# Patient Record
Sex: FEMALE | Race: WHITE | NOT HISPANIC OR LATINO | URBAN - METROPOLITAN AREA
[De-identification: names, ages, dates, MRNs, and addresses within clinical notes are randomized per-mention and may not be internally consistent; named-entity substitution may affect disease eponyms.]

---

## 2017-06-09 ENCOUNTER — EMERGENCY (EMERGENCY)
Facility: HOSPITAL | Age: 27
LOS: 1 days | Discharge: HOME | End: 2017-06-09
Attending: EMERGENCY MEDICINE | Admitting: EMERGENCY MEDICINE
Payer: MEDICAID

## 2017-06-09 VITALS
HEART RATE: 90 BPM | SYSTOLIC BLOOD PRESSURE: 147 MMHG | DIASTOLIC BLOOD PRESSURE: 90 MMHG | TEMPERATURE: 98 F | OXYGEN SATURATION: 100 % | RESPIRATION RATE: 18 BRPM

## 2017-06-09 PROCEDURE — 99284 EMERGENCY DEPT VISIT MOD MDM: CPT

## 2017-06-09 PROCEDURE — 73140 X-RAY EXAM OF FINGER(S): CPT | Mod: 26,LT

## 2017-06-09 RX ORDER — ACETAMINOPHEN 500 MG
975 TABLET ORAL ONCE
Qty: 0 | Refills: 0 | Status: COMPLETED | OUTPATIENT
Start: 2017-06-09 | End: 2017-06-09

## 2017-06-09 RX ADMIN — Medication 975 MILLIGRAM(S): at 14:33

## 2017-06-09 RX ADMIN — Medication 1 TABLET(S): at 19:02

## 2017-06-09 RX ADMIN — Medication 975 MILLIGRAM(S): at 19:02

## 2017-06-09 NOTE — ED PROVIDER NOTE - PROGRESS NOTE DETAILS
Hand surgery bedside. will send patient to hand clinic and follow up. will call [phone] (plastic surgery clinic) to follow up with Dr. Brush.

## 2017-06-09 NOTE — ED PROVIDER NOTE - MEDICAL DECISION MAKING DETAILS
27yoF pw finger laceration with tendon injury. xrays, pain control, hand surgery consult, reass. 27yoF pw finger laceration with tendon injury. xrays, pain control, hand surgery consult, reass.  Amelia att: 26 yo with finger laceration concerning for tendon involvement, no foreign body, irrigated/cleansed at urgent care.  Hand surgery notified.

## 2017-06-09 NOTE — ED PROVIDER NOTE - PHYSICAL EXAMINATION
LEFT 2nd digit, unable to flex DIP, sensation intact. PIP, MCP intact. small pulsating bleed controlled with pressure guaze. LEFT 2nd digit, unable to flex DIP, sensation intact. PIP, MCP intact. small pulsating bleed controlled with pressure guaze.  Fink att: General: Well appearing, nontoxic, no acute distress; Head: Normocephalic Atraumatic; Eyes: PERRL, EOMI; ENT: Airway patent; Neck: supple; Chest: equal chest rise, unlabored breathing;  Musculoskeletal: Left 2nd digit perpendiculr laceration at middle phalynx, no FB, unable to flex digit, calves symmetric, nontender, no palpable cord; Skin: No rash, normal skin tone; Neuro: Alert and Oriented to person, place, and time; No focal deficit, CN 2-12 symmetric and intact  LEFT 2nd digit, unable to flex DIP, sensation intact. PIP, MCP intact. small pulsating bleed controlled with pressure guaze.

## 2017-06-09 NOTE — ED PROVIDER NOTE - OBJECTIVE STATEMENT
27yoF otherwise healthy pw laceration to left index finger after knife slipped while trying to open a can. Sharp, clean kitchen knife. immediate bleeding and pain. pt says tip of finger now feels "funny like numb". seen at urgent care where it was initially washed out and then bandaged due to "an arterial bleed and tendon cut". Denies any other injuries, tetanus up to date. pain moderate.

## 2017-06-09 NOTE — ED PROVIDER NOTE - CARE PLAN
Principal Discharge DX:	Laceration of finger of left hand, initial encounter Principal Discharge DX:	Laceration of finger of left hand, initial encounter  Instructions for follow-up, activity and diet:	1. Keep the wound clean and dry for 24 hours then gently rinse the wound daily without scrubbing.  Apply neosporin or bacitracin 1-2 times daily.  Change the dressing daily.  2. Return to the ED with new or worsening symptoms including fever, pus, redness, or uncontrolled pain.  3. Take Principal Discharge DX:	Laceration of finger of left hand, initial encounter  Instructions for follow-up, activity and diet:	1. Keep the wound clean and dry for 24 hours then gently rinse the wound daily without scrubbing.  Apply neosporin or bacitracin 1-2 times daily.  Change the dressing daily.  2. Return to the ED with new or worsening symptoms including fever, pus, redness, or uncontrolled pain.  3. Take 1 tablet of Augmentin two times a day for 3 days.   4. Dr. Brush [phone] for an appointment.

## 2017-06-09 NOTE — ED ADULT TRIAGE NOTE - CHIEF COMPLAINT QUOTE
pt was using an knife to open a can and lacerated her lt index finger--pt went to first [facility] and was told she cut tendon and its deep and to go to Er for hand specialist
